# Patient Record
Sex: MALE | Race: WHITE | NOT HISPANIC OR LATINO
[De-identification: names, ages, dates, MRNs, and addresses within clinical notes are randomized per-mention and may not be internally consistent; named-entity substitution may affect disease eponyms.]

---

## 2024-05-01 PROBLEM — Z00.00 ENCOUNTER FOR PREVENTIVE HEALTH EXAMINATION: Status: ACTIVE | Noted: 2024-05-01

## 2024-05-02 ENCOUNTER — RESULT REVIEW (OUTPATIENT)
Age: 54
End: 2024-05-02

## 2024-05-02 ENCOUNTER — APPOINTMENT (OUTPATIENT)
Dept: THORACIC SURGERY | Facility: HOSPITAL | Age: 54
End: 2024-05-02

## 2024-05-07 ENCOUNTER — TRANSCRIPTION ENCOUNTER (OUTPATIENT)
Age: 54
End: 2024-05-07

## 2024-05-20 ENCOUNTER — APPOINTMENT (OUTPATIENT)
Dept: THORACIC SURGERY | Facility: CLINIC | Age: 54
End: 2024-05-20

## 2024-05-20 VITALS
RESPIRATION RATE: 16 BRPM | HEART RATE: 75 BPM | SYSTOLIC BLOOD PRESSURE: 123 MMHG | BODY MASS INDEX: 33.8 KG/M2 | HEIGHT: 68 IN | OXYGEN SATURATION: 96 % | WEIGHT: 223 LBS | DIASTOLIC BLOOD PRESSURE: 82 MMHG

## 2024-05-20 NOTE — REASON FOR VISIT
[de-identified] : LVATS decortication [de-identified] : 5/2/2024 [de-identified] : On 4/30 patient presented to [facility] ED with cc of SOB & pleuritic chest pain.  Imaging demonstrated pneumonia and left pleural effusion/empyema.  Thoracic surgery was consulted and patient underwent LVATS decortication. Postoperative course was complicated by respiratory failure and need for immediate reintubation in OR.  Patient was managed in ICU for next 3 days.  He was ultimately transferred to surgical floor on POD 4.  He was managed with IV abx and pain meds.   Patient had CTs d/c'd without incident.  He is now ready for d/c with IV abx via picc upon discharge. He now presents for his post operative appointment.   Patient presents today feeling well. He endorses some soreness at the surgical site and fatigue. He currently has a left PICC line and receiving IV abx until the end of the month. He denies any signs or symptoms of infection at this time. [Spouse] : spouse

## 2024-05-20 NOTE — COUNSELING
[Hygeine (Including Daily Shower)] : hygeine (including daily shower) [Importance of Regular Medical Follow-Up] : the importance of regular medical follow-up [S/S of infection] : signs and symptoms of infection (and to whom it should be reported) [Progressive Ambulation/Activity] : progressive ambulation/activity [FreeTextEntry1] : Pathology 5/2/2024 FINAL PATHOLOGIC DIAGNOSIS  LEFT PLEURAL PEEL, EXCISION: - Fibroadipose tissue showing foci of granulation tissue, reactive mesothelial cells, mixed inflammatory infiltrate with abundant fibrinopurulent exudate.

## 2024-05-20 NOTE — DISCUSSION/SUMMARY
[Doing Well] : is doing well [Excellent Pain Control] : has excellent pain control [3] : 3 [Remove Sutures/Staples] : removed sutures/staples [FreeTextEntry1] : Surgical site clean dry and intact with no signs or symptoms of infection upon assessment. Suture x2 removed without incident. Patient endorses good pain control. Patient will be following up with pulmonology and infectious disease. All questions addressed. Pt. verbalized understanding of looking for signs and symptoms of infection and will call thoracic office with any questions or concerns.

## 2024-05-23 ENCOUNTER — APPOINTMENT (OUTPATIENT)
Dept: PULMONOLOGY | Facility: CLINIC | Age: 54
End: 2024-05-23
Payer: COMMERCIAL

## 2024-05-23 ENCOUNTER — TRANSCRIPTION ENCOUNTER (OUTPATIENT)
Age: 54
End: 2024-05-23

## 2024-05-23 VITALS
WEIGHT: 220 LBS | DIASTOLIC BLOOD PRESSURE: 72 MMHG | SYSTOLIC BLOOD PRESSURE: 114 MMHG | HEART RATE: 98 BPM | HEIGHT: 68 IN | BODY MASS INDEX: 33.34 KG/M2 | OXYGEN SATURATION: 96 %

## 2024-05-23 DIAGNOSIS — G47.30 SLEEP APNEA, UNSPECIFIED: ICD-10-CM

## 2024-05-23 DIAGNOSIS — J18.9 PNEUMONIA, UNSPECIFIED ORGANISM: ICD-10-CM

## 2024-05-23 DIAGNOSIS — J86.9 PYOTHORAX W/OUT FISTULA: ICD-10-CM

## 2024-05-23 PROCEDURE — G2211 COMPLEX E/M VISIT ADD ON: CPT

## 2024-05-23 PROCEDURE — 99204 OFFICE O/P NEW MOD 45 MIN: CPT

## 2024-05-23 RX ORDER — LOSARTAN POTASSIUM 100 MG/1
TABLET, FILM COATED ORAL
Refills: 0 | Status: ACTIVE | COMMUNITY

## 2024-05-23 NOTE — PHYSICAL EXAM
[No Acute Distress] : no acute distress [Normal Oropharynx] : normal oropharynx [Normal Appearance] : normal appearance [No Neck Mass] : no neck mass [Normal Rate/Rhythm] : normal rate/rhythm [Normal S1, S2] : normal s1, s2 [No Murmurs] : no murmurs [No Resp Distress] : no resp distress [Clear to Auscultation Bilaterally] : clear to auscultation bilaterally [No Abnormalities] : no abnormalities [Benign] : benign [Normal Gait] : normal gait [No Clubbing] : no clubbing [No Cyanosis] : no cyanosis [No Edema] : no edema [FROM] : FROM [Normal Color/ Pigmentation] : normal color/ pigmentation [No Focal Deficits] : no focal deficits [Oriented x3] : oriented x3 [Normal Affect] : normal affect [TextBox_80] : laparoscopic trocar insertion sites appear well-healing.

## 2024-05-23 NOTE — ASSESSMENT
[FreeTextEntry1] : 54-year-old male with above-mentioned history, presenting for posthospitalization discharge for severe pneumonia complicated with empyema, status post VATS decortication.  > CAP > Empyema > Severe CEIC  - Overall patient is recovering well with only mild dyspnea on exertion, that is improving day by day.  He has no coughing, wheezing, or significant pain and currently does not require any additional therapy at this point. - Recommend antibiotics to continue for total course of 6 weeks given severity of his infection.  Currently on ertapenem and may transition to p.o. antibiotics soon, but will discuss with ID - CT scan and PFT to be performed after antibiotics have completed.  Tentatively scheduled for July 2024. - Patient has a history of severe CECI but was somewhat intolerant of CPAP at the time of diagnosis.  He is interested in treatment and currently has snoring, witnessed apneas, daytime fatigue and sleepiness.  Will send in for split-night study to deliver most effective and tolerable therapy as soon as possible.  Follow-up after above workup completed.

## 2024-05-23 NOTE — HISTORY OF PRESENT ILLNESS
[TextBox_4] : 54-year-old male with history of hypertension, severe sleep apnea (untreated) presenting for post-hospitalization discharge for pneumonia with parapneumonic effusion, necessitating VATS decortication on 5/2/24 (showed empyema). Postoperatively, he was extubated but needed reintubation for AMS and hypoxemia, and was transferred to the ICU. He was extubated on 5/3/24 and recovered well since then, ultimately being discharged on 5/7/24 with a left PICC line to continue his IV Abx. He followed up with CTSx and ID since then and has been doing well.  He has slight dyspnea on exertion but otherwise currently has no respiratory issues including dyspnea at rest, wheezing, coughing, chest pain, etc. Also has no constitutional symptoms including fevers, night sweats, unintentional weight loss.  [Awakes Unrefreshed] : awakes unrefreshed [Awakes with Dry Mouth] : awakes with dry mouth [Daytime Somnolence] : daytime somnolence [Difficulty Maintaining Sleep] : difficulty maintaining sleep [Fatigue] : fatigue [Frequent Nocturnal Awakening] : frequent nocturnal awakening [Nonrestorative Sleep] : nonrestorative sleep [Snoring] : snoring [Witnessed Apneas] : witnessed apneas [ESS] : 10

## 2024-06-04 ENCOUNTER — NON-APPOINTMENT (OUTPATIENT)
Age: 54
End: 2024-06-04

## 2024-06-16 ENCOUNTER — RESULT REVIEW (OUTPATIENT)
Age: 54
End: 2024-06-16

## 2025-03-12 ENCOUNTER — APPOINTMENT (OUTPATIENT)
Dept: PULMONOLOGY | Facility: CLINIC | Age: 55
End: 2025-03-12
Payer: COMMERCIAL

## 2025-03-12 VITALS
SYSTOLIC BLOOD PRESSURE: 132 MMHG | DIASTOLIC BLOOD PRESSURE: 78 MMHG | OXYGEN SATURATION: 98 % | HEART RATE: 83 BPM | WEIGHT: 224 LBS | HEIGHT: 68 IN | BODY MASS INDEX: 33.95 KG/M2

## 2025-03-12 DIAGNOSIS — G47.30 SLEEP APNEA, UNSPECIFIED: ICD-10-CM

## 2025-03-12 DIAGNOSIS — J86.9 PYOTHORAX W/OUT FISTULA: ICD-10-CM

## 2025-03-12 DIAGNOSIS — J18.9 PNEUMONIA, UNSPECIFIED ORGANISM: ICD-10-CM

## 2025-03-12 PROCEDURE — 99214 OFFICE O/P EST MOD 30 MIN: CPT

## 2025-04-01 ENCOUNTER — APPOINTMENT (OUTPATIENT)
Dept: PULMONOLOGY | Facility: CLINIC | Age: 55
End: 2025-04-01
Payer: COMMERCIAL

## 2025-04-01 DIAGNOSIS — G47.30 SLEEP APNEA, UNSPECIFIED: ICD-10-CM

## 2025-04-01 PROCEDURE — 99212 OFFICE O/P EST SF 10 MIN: CPT | Mod: 93
